# Patient Record
Sex: FEMALE | Race: WHITE | Employment: OTHER | URBAN - METROPOLITAN AREA
[De-identification: names, ages, dates, MRNs, and addresses within clinical notes are randomized per-mention and may not be internally consistent; named-entity substitution may affect disease eponyms.]

---

## 2023-03-11 PROBLEM — I60.9 SUBARACHNOID HEMORRHAGE, NONTRAUMATIC (MULTI): Status: ACTIVE | Noted: 2023-03-11

## 2023-03-11 PROBLEM — E78.5 HYPERLIPIDEMIA: Status: ACTIVE | Noted: 2023-03-11

## 2023-03-11 PROBLEM — R73.9 HYPERGLYCEMIA: Status: ACTIVE | Noted: 2023-03-11

## 2023-03-11 PROBLEM — E55.9 VITAMIN D DEFICIENCY: Status: ACTIVE | Noted: 2023-03-11

## 2023-03-11 PROBLEM — R93.1 AGATSTON CORONARY ARTERY CALCIUM SCORE GREATER THAN 400: Status: ACTIVE | Noted: 2023-03-11

## 2023-03-11 RX ORDER — METFORMIN HYDROCHLORIDE 500 MG/1
1 TABLET ORAL 2 TIMES DAILY
COMMUNITY
Start: 2021-04-15 | End: 2023-04-10 | Stop reason: SDUPTHER

## 2023-03-11 RX ORDER — PREDNISOLONE ACETATE 10 MG/ML
SUSPENSION/ DROPS OPHTHALMIC
COMMUNITY
Start: 2022-07-13 | End: 2024-04-22 | Stop reason: ALTCHOICE

## 2023-03-11 RX ORDER — CHOLECALCIFEROL (VITAMIN D3) 50 MCG
1 TABLET ORAL DAILY
COMMUNITY
Start: 2019-02-08

## 2023-03-11 RX ORDER — PHENOL 1.4 %
1 AEROSOL, SPRAY (ML) MUCOUS MEMBRANE DAILY
COMMUNITY
Start: 2018-11-02

## 2023-03-11 RX ORDER — ATORVASTATIN CALCIUM 20 MG/1
1 TABLET, FILM COATED ORAL DAILY
COMMUNITY
Start: 2022-02-23 | End: 2023-04-10 | Stop reason: SDUPTHER

## 2023-03-11 RX ORDER — LATANOPROST 50 UG/ML
SOLUTION/ DROPS OPHTHALMIC
COMMUNITY
Start: 2023-03-03

## 2023-03-11 RX ORDER — IBUPROFEN 200 MG
CAPSULE ORAL
COMMUNITY
Start: 2018-11-13

## 2023-03-23 LAB
ALANINE AMINOTRANSFERASE (SGPT) (U/L) IN SER/PLAS: 26 U/L (ref 7–45)
ALBUMIN (G/DL) IN SER/PLAS: 4 G/DL (ref 3.4–5)
ALKALINE PHOSPHATASE (U/L) IN SER/PLAS: 55 U/L (ref 33–136)
ANION GAP IN SER/PLAS: 10 MMOL/L (ref 10–20)
ASPARTATE AMINOTRANSFERASE (SGOT) (U/L) IN SER/PLAS: 20 U/L (ref 9–39)
BASOPHILS (10*3/UL) IN BLOOD BY AUTOMATED COUNT: 0.04 X10E9/L (ref 0–0.1)
BASOPHILS/100 LEUKOCYTES IN BLOOD BY AUTOMATED COUNT: 0.6 % (ref 0–2)
BILIRUBIN TOTAL (MG/DL) IN SER/PLAS: 0.8 MG/DL (ref 0–1.2)
CALCIDIOL (25 OH VITAMIN D3) (NG/ML) IN SER/PLAS: 89 NG/ML
CALCIUM (MG/DL) IN SER/PLAS: 9.1 MG/DL (ref 8.6–10.3)
CARBON DIOXIDE, TOTAL (MMOL/L) IN SER/PLAS: 30 MMOL/L (ref 21–32)
CHLORIDE (MMOL/L) IN SER/PLAS: 106 MMOL/L (ref 98–107)
CREATININE (MG/DL) IN SER/PLAS: 0.73 MG/DL (ref 0.5–1.05)
EOSINOPHILS (10*3/UL) IN BLOOD BY AUTOMATED COUNT: 0.25 X10E9/L (ref 0–0.7)
EOSINOPHILS/100 LEUKOCYTES IN BLOOD BY AUTOMATED COUNT: 3.8 % (ref 0–6)
ERYTHROCYTE DISTRIBUTION WIDTH (RATIO) BY AUTOMATED COUNT: 12 % (ref 11.5–14.5)
ERYTHROCYTE MEAN CORPUSCULAR HEMOGLOBIN CONCENTRATION (G/DL) BY AUTOMATED: 33.1 G/DL (ref 32–36)
ERYTHROCYTE MEAN CORPUSCULAR VOLUME (FL) BY AUTOMATED COUNT: 90 FL (ref 80–100)
ERYTHROCYTES (10*6/UL) IN BLOOD BY AUTOMATED COUNT: 4.84 X10E12/L (ref 4–5.2)
GFR FEMALE: >90 ML/MIN/1.73M2
GLUCOSE (MG/DL) IN SER/PLAS: 110 MG/DL (ref 74–99)
HEMATOCRIT (%) IN BLOOD BY AUTOMATED COUNT: 43.8 % (ref 36–46)
HEMOGLOBIN (G/DL) IN BLOOD: 14.5 G/DL (ref 12–16)
IMMATURE GRANULOCYTES/100 LEUKOCYTES IN BLOOD BY AUTOMATED COUNT: 0.2 % (ref 0–0.9)
LEUKOCYTES (10*3/UL) IN BLOOD BY AUTOMATED COUNT: 6.6 X10E9/L (ref 4.4–11.3)
LYMPHOCYTES (10*3/UL) IN BLOOD BY AUTOMATED COUNT: 2.1 X10E9/L (ref 1.2–4.8)
LYMPHOCYTES/100 LEUKOCYTES IN BLOOD BY AUTOMATED COUNT: 31.9 % (ref 13–44)
MONOCYTES (10*3/UL) IN BLOOD BY AUTOMATED COUNT: 0.72 X10E9/L (ref 0.1–1)
MONOCYTES/100 LEUKOCYTES IN BLOOD BY AUTOMATED COUNT: 10.9 % (ref 2–10)
NEUTROPHILS (10*3/UL) IN BLOOD BY AUTOMATED COUNT: 3.46 X10E9/L (ref 1.2–7.7)
NEUTROPHILS/100 LEUKOCYTES IN BLOOD BY AUTOMATED COUNT: 52.6 % (ref 40–80)
PLATELETS (10*3/UL) IN BLOOD AUTOMATED COUNT: 228 X10E9/L (ref 150–450)
POTASSIUM (MMOL/L) IN SER/PLAS: 4 MMOL/L (ref 3.5–5.3)
PROTEIN TOTAL: 6.2 G/DL (ref 6.4–8.2)
SODIUM (MMOL/L) IN SER/PLAS: 142 MMOL/L (ref 136–145)
THYROTROPIN (MIU/L) IN SER/PLAS BY DETECTION LIMIT <= 0.05 MIU/L: 2.21 MIU/L (ref 0.44–3.98)
UREA NITROGEN (MG/DL) IN SER/PLAS: 21 MG/DL (ref 6–23)

## 2023-04-09 NOTE — PROGRESS NOTES
"Subjective   Reason for Visit: Chelsey Ayala is an 68 y.o. female here for a Medicare Wellness visit.               HPI        April 10, 2023      This 68-year-old woman is here today for her Medicare wellness exam.    April 5, 2022  This 67-year-old woman is here today for her \"welcome to Medicare\" assessment.    March 2nd 2022 (Dr Willis)  66 year old here for follow up of SAH.   Last note Sept. 2021:  See Dr. Pardo's notes:  She was admitted on April 11 and discharged on the 14th. Her main [facility] doctor was Dr. Jhony Da Silva  She was admitted for a benign perimesencephalic subarachnoid hemorrhage.  She was transferred from the [facility] emergency room in [facility] via helicopter to the main campus because of symptoms of a headache and a CT scan showing a subarachnoid hemorrhage. An angiogram was done on April 12 which did not show an aneurysm or vascular malformation causing her hemorrhage. She received transcranial Dopplers to monitor for vasospasms. Her neurological status slowly improved and she was negative for vasospasms at the time of discharge.  DVT prophylaxis included compression stockings and heparin. Physical medicine was started on April 13 she was then discharged home with outpatient therapy.  The CT of the head and neck suggested because the hemorrhage was in the basal cisterns to volume distribution and lack of identifiable underlying aneurysm suggested the nonaneurysmal perimesencephalic subarachnoid hemorrhage. There was no apparent cervical or intracranial arterial occlusion or high-grade stenosis. An angiogram done on April 12 showed moderate atherosclerosis and an otherwise normal cerebral angiogram with no evidence of aneurysm AVM or aVF an MRI of the brain and cervical spine was done on April 13 and did not identify any intracranial mass there was a slight reduction in volume and redistribution of the previously depicted subarachnoid hemorrhage insinuating predominantly " throughout the basal cisterns there was a slight increased hemorrhaging layer in the occipital horns of the lateral ventricles there was a single caliber of the ventricular system.  Has had a follow up Ct Scan of the brain in May, 2021. No new blood and subarachnoid blood was re-absorbed.   She had the SAH 11 days post second Pfizer COVID 19 vaccine and she wonders if it could be related. I reassured her that it would be highly unlikely.   PMH: DM, HLD  PFH: Father had atherosclerosis. IMP: 1. S/P nonaneurysmal perimesencephalic subarachnoid hemorrhage. Doing well. Neurologically normal.   REC:  1. Check Ct Scan of brain without contrast.   You are doing great!      January 20, 2022  This 66-year-old woman is here today to follow-up on her diabetes and hyperlipidemia.  Total 231 HDL 50 ratio 4.6  triglycerides 120  Hemoglobin A1c 7.3%  Patient is taking metformin 500 mg once a day, will increase to twice a day and if hemoglobin A1c is not below 7 in 3 months we will consider adding additional medication. Previously    August 16, 2021  This 66-year-old woman is here today for several reasons. She is keeping her blood sugar under control with a hemoglobin A1c of 6.6%. She is only taking her Metformin once a day because when she took it twice a day she was getting some abnormally low readings.  She would also like to get a referral to neurology because of her subarachnoid hemorrhage. I will plan on seeing her back in 6 months.  Hemoglobin A1c 6.6%  Lipids completed  Microalbumin 30 creatinine 100 AC ratio less than 30 normal    May 13, 2021  This 66-year-old woman is recovering from her subarachnoid hemorrhage. She has been taking Metformin except around the time when she is getting contrast material with imaging. He has an appointment tomorrow on May 14 to recheck the CT scan to see if the blood from her bleed has been reabsorbed. She also has an appointment tomorrow with practitioners for some cognitive  testing following her hemorrhage.  She also is getting a nasal swab today for  Resistant staph aureus. She has been monitoring her blood sugars and generally they have been in the range of 130 240. This is especially true over the last 1 to 2 weeks that she has been able to get back to the gym for exercise. Her hemoglobin A1c on May 18, 2020 was 7.7  On August 21, 2020 was 7.3  On March 1, 2021 it was 6.5  Today on May 13 it is back up to 7.7  We are keeping the medication the same and she will continue to work with diet and exercise.    April 15, 2021  This 66-year-old woman was discharged from the [facility] on April 14 after having been admitted on April 11 for a nonaneurysmal subarachnoid hemorrhage. When the hospital determined risk factors that could have played a role in the development of her hemorrhage the only risk factor she found that she felt was applicable to her was diabetes. As a result she would like to get her diabetes under better control. She has been checking fasting blood sugars and they have generally been averaging in the high 130s. Today her hemoglobin A1c is 7.3% this would suggest this at higher average fasting blood sugar closer to 150.  I am starting her on a prescription for Metformin 500 mg twice daily and I will plan on seeing her back in 1 month with a list of her blood sugar readings.  She is scheduled to get another CT scan on May 14.  She was admitted on April 11 and discharged on the 14th. Her main [facility] doctor was Dr. Jhony Da Silva  She was admitted for a benign perimesencephalic subarachnoid hemorrhage.  She was transferred from the [facility] emergency room in [facility] via helicopter to the main campus because of symptoms of a headache and a CT scan showing a subarachnoid hemorrhage. An angiogram was done on April 12 which did not show an aneurysm or vascular malformation causing her hemorrhage. He received transcranial Dopplers to monitor for vasospasms. Her  neurological status slowly improved and she was negative for vasospasms at the time of discharge.  DVT prophylaxis included compression stockings and heparin. Physical medicine was started on April 13 she was then discharged home with outpatient therapy.  The CT of the head and neck suggested because the hemorrhage was in the basal cisterns to volume distribution and lack of identifiable underlying aneurysm suggested the nonaneurysmal perimesencephalic subarachnoid hemorrhage. There was no apparent cervical or intracranial arterial occlusion or high-grade stenosis. An angiogram done on April 12 showed moderate atherosclerosis and an otherwise normal cerebral angiogram with no evidence of aneurysm AVM or aVF an MRI of the brain and cervical spine was done on April 13 and did not identify any intracranial mass there was a slight reduction in volume and redistribution of the previously depicted subarachnoid hemorrhage insinuating predominantly throughout the basal cisterns there was a slight increased hemorrhaging layer in the occipital horns of the lateral ventricles there was a single caliber of the ventricular system    Patient Care Team:  Jayjay Pardo MD as PCP - General  Jayjay Pardo MD as PCP - [facility]     Review of Systems   Constitutional:  Negative for appetite change, chills, diaphoresis, fatigue, fever and unexpected weight change.   HENT:  Negative for congestion, ear discharge, ear pain, hearing loss, nosebleeds, sinus pressure, sore throat, tinnitus, trouble swallowing and voice change.    Eyes:  Negative for photophobia, pain, discharge, redness, itching and visual disturbance.   Respiratory:  Negative for cough, chest tightness, shortness of breath and wheezing.    Cardiovascular:  Negative for chest pain, palpitations and leg swelling.   Gastrointestinal:  Negative for abdominal distention, abdominal pain, anal bleeding, blood in stool, constipation, diarrhea, nausea and  vomiting.   Endocrine: Negative for polyuria.   Genitourinary:  Negative for frequency and hematuria.   Musculoskeletal:  Negative for arthralgias, back pain, neck pain and neck stiffness.   Skin:  Negative for rash.   Allergic/Immunologic: Negative for environmental allergies and food allergies.   Neurological:  Negative for dizziness, tremors, seizures, syncope, speech difficulty, weakness, numbness and headaches.   Hematological:  Negative for adenopathy. Does not bruise/bleed easily.   Psychiatric/Behavioral:  Negative for agitation, behavioral problems, confusion, decreased concentration, sleep disturbance and suicidal ideas. The patient is not nervous/anxious.        Objective   Vitals:  There were no vitals taken for this visit.      Physical Exam  Constitutional:       General: She is not in acute distress.     Appearance: Normal appearance. She is normal weight. She is not ill-appearing.   HENT:      Head: Normocephalic.      Right Ear: Tympanic membrane and ear canal normal. There is no impacted cerumen.      Left Ear: Tympanic membrane and ear canal normal.      Nose: Nose normal. No congestion or rhinorrhea.      Mouth/Throat:      Mouth: Mucous membranes are moist.      Pharynx: Oropharynx is clear.   Eyes:      Extraocular Movements: Extraocular movements intact.      Conjunctiva/sclera: Conjunctivae normal.      Pupils: Pupils are equal, round, and reactive to light.   Neck:      Vascular: No carotid bruit.   Cardiovascular:      Rate and Rhythm: Normal rate and regular rhythm.      Pulses: Normal pulses.      Heart sounds: Normal heart sounds. No murmur heard.  Pulmonary:      Effort: Pulmonary effort is normal. No respiratory distress.      Breath sounds: No wheezing or rales.   Abdominal:      General: Abdomen is flat. Bowel sounds are normal. There is no distension.      Palpations: There is no mass.      Tenderness: There is no abdominal tenderness. There is no guarding or rebound.      Hernia:  No hernia is present.   Genitourinary:     Rectum: Normal.   Musculoskeletal:         General: No swelling, tenderness or deformity. Normal range of motion.      Cervical back: Normal range of motion and neck supple. No tenderness.      Right lower leg: No edema.      Left lower leg: No edema.   Lymphadenopathy:      Cervical: No cervical adenopathy.   Skin:     General: Skin is warm and dry.      Findings: No erythema or rash.   Neurological:      Mental Status: She is alert.         Assessment/Plan   Problem List Items Addressed This Visit    None

## 2023-04-10 ENCOUNTER — OFFICE VISIT (OUTPATIENT)
Dept: PRIMARY CARE | Facility: CLINIC | Age: 68
End: 2023-04-10
Payer: MEDICARE

## 2023-04-10 VITALS
SYSTOLIC BLOOD PRESSURE: 120 MMHG | TEMPERATURE: 97.9 F | DIASTOLIC BLOOD PRESSURE: 76 MMHG | WEIGHT: 179 LBS | BODY MASS INDEX: 30.56 KG/M2 | HEIGHT: 64 IN

## 2023-04-10 DIAGNOSIS — R73.9 HYPERGLYCEMIA: ICD-10-CM

## 2023-04-10 DIAGNOSIS — E55.9 VITAMIN D DEFICIENCY: ICD-10-CM

## 2023-04-10 DIAGNOSIS — I10 PRIMARY HYPERTENSION: ICD-10-CM

## 2023-04-10 DIAGNOSIS — R93.1 AGATSTON CORONARY ARTERY CALCIUM SCORE GREATER THAN 400: ICD-10-CM

## 2023-04-10 DIAGNOSIS — Z00.00 MEDICARE ANNUAL WELLNESS VISIT, SUBSEQUENT: Primary | ICD-10-CM

## 2023-04-10 DIAGNOSIS — E78.5 HYPERLIPIDEMIA, UNSPECIFIED HYPERLIPIDEMIA TYPE: ICD-10-CM

## 2023-04-10 DIAGNOSIS — Z86.79 HISTORY OF SUBARACHNOID HEMORRHAGE: ICD-10-CM

## 2023-04-10 LAB
POC HDL CHOLESTEROL: 51 MG/DL (ref 0–50)
POC HEMOGLOBIN A1C: 6.4 % (ref 4.2–6.5)
POC LDL CHOLESTEROL: 70 MG/DL (ref 0–100)
POC NON-HDL CHOLESTEROL: 86 MG/DL (ref 0–130)
POC TOTAL CHOLESTEROL/HDL RATIO: 2.7 (ref 0–4.5)
POC TOTAL CHOLESTEROL: 136 MG/DL (ref 0–199)
POC TRIGLYCERIDES: 77 MG/DL (ref 0–150)

## 2023-04-10 PROCEDURE — 1159F MED LIST DOCD IN RCRD: CPT | Performed by: FAMILY MEDICINE

## 2023-04-10 PROCEDURE — 3078F DIAST BP <80 MM HG: CPT | Performed by: FAMILY MEDICINE

## 2023-04-10 PROCEDURE — 3074F SYST BP LT 130 MM HG: CPT | Performed by: FAMILY MEDICINE

## 2023-04-10 PROCEDURE — 36416 COLLJ CAPILLARY BLOOD SPEC: CPT | Performed by: FAMILY MEDICINE

## 2023-04-10 PROCEDURE — 1160F RVW MEDS BY RX/DR IN RCRD: CPT | Performed by: FAMILY MEDICINE

## 2023-04-10 PROCEDURE — 1170F FXNL STATUS ASSESSED: CPT | Performed by: FAMILY MEDICINE

## 2023-04-10 PROCEDURE — 99214 OFFICE O/P EST MOD 30 MIN: CPT | Performed by: FAMILY MEDICINE

## 2023-04-10 PROCEDURE — 1036F TOBACCO NON-USER: CPT | Performed by: FAMILY MEDICINE

## 2023-04-10 PROCEDURE — G0439 PPPS, SUBSEQ VISIT: HCPCS | Performed by: FAMILY MEDICINE

## 2023-04-10 PROCEDURE — 83036 HEMOGLOBIN GLYCOSYLATED A1C: CPT | Performed by: FAMILY MEDICINE

## 2023-04-10 PROCEDURE — 80061 LIPID PANEL: CPT | Performed by: FAMILY MEDICINE

## 2023-04-10 RX ORDER — METFORMIN HYDROCHLORIDE 500 MG/1
500 TABLET ORAL 2 TIMES DAILY
Qty: 180 TABLET | Refills: 3 | Status: SHIPPED | OUTPATIENT
Start: 2023-04-10 | End: 2024-03-25

## 2023-04-10 RX ORDER — SPIRONOLACTONE 25 MG
1 TABLET ORAL DAILY
COMMUNITY

## 2023-04-10 RX ORDER — GUAIFENESIN AND PHENYLEPHRINE HCL 400; 10 MG/1; MG/1
1 TABLET ORAL EVERY 24 HOURS
COMMUNITY
Start: 2021-03-01 | End: 2024-04-22 | Stop reason: SDUPTHER

## 2023-04-10 RX ORDER — ATORVASTATIN CALCIUM 20 MG/1
20 TABLET, FILM COATED ORAL DAILY
Qty: 90 TABLET | Refills: 3 | Status: SHIPPED | OUTPATIENT
Start: 2023-04-10 | End: 2024-03-25

## 2023-04-10 ASSESSMENT — ENCOUNTER SYMPTOMS
VOICE CHANGE: 0
EYE DISCHARGE: 0
SEIZURES: 0
HEMATURIA: 0
NERVOUS/ANXIOUS: 0
SPEECH DIFFICULTY: 0
DECREASED CONCENTRATION: 0
SINUS PRESSURE: 0
BLOOD IN STOOL: 0
SHORTNESS OF BREATH: 0
CONFUSION: 0
COUGH: 0
DIAPHORESIS: 0
FEVER: 0
CHEST TIGHTNESS: 0
DIARRHEA: 0
BRUISES/BLEEDS EASILY: 0
NECK PAIN: 0
TROUBLE SWALLOWING: 0
APPETITE CHANGE: 0
ADENOPATHY: 0
DIZZINESS: 0
CHILLS: 0
WHEEZING: 0
AGITATION: 0
EYE PAIN: 0
ABDOMINAL PAIN: 0
UNEXPECTED WEIGHT CHANGE: 0
VOMITING: 0
WEAKNESS: 0
BACK PAIN: 0
EYE ITCHING: 0
NUMBNESS: 0
SLEEP DISTURBANCE: 0
TREMORS: 0
FATIGUE: 0
HEADACHES: 0
SORE THROAT: 0
ABDOMINAL DISTENTION: 0
NAUSEA: 0
NECK STIFFNESS: 0
PALPITATIONS: 0
CONSTIPATION: 0
PHOTOPHOBIA: 0
ANAL BLEEDING: 0
ARTHRALGIAS: 0
FREQUENCY: 0
EYE REDNESS: 0

## 2023-04-10 ASSESSMENT — ACTIVITIES OF DAILY LIVING (ADL)
MANAGING_FINANCES: INDEPENDENT
TAKING_MEDICATION: INDEPENDENT
DOING_HOUSEWORK: INDEPENDENT
GROCERY_SHOPPING: INDEPENDENT
BATHING: INDEPENDENT
DRESSING: INDEPENDENT

## 2023-04-10 ASSESSMENT — PATIENT HEALTH QUESTIONNAIRE - PHQ9
SUM OF ALL RESPONSES TO PHQ QUESTIONS 1-9: 0
1. LITTLE INTEREST OR PLEASURE IN DOING THINGS: NOT AT ALL
SUM OF ALL RESPONSES TO PHQ9 QUESTIONS 1 AND 2: 0
8. MOVING OR SPEAKING SO SLOWLY THAT OTHER PEOPLE COULD HAVE NOTICED. OR THE OPPOSITE, BEING SO FIGETY OR RESTLESS THAT YOU HAVE BEEN MOVING AROUND A LOT MORE THAN USUAL: NOT AT ALL
SUM OF ALL RESPONSES TO PHQ9 QUESTIONS 1 AND 2: 0
4. FEELING TIRED OR HAVING LITTLE ENERGY: NOT AT ALL
3. TROUBLE FALLING OR STAYING ASLEEP OR SLEEPING TOO MUCH: NOT AT ALL
2. FEELING DOWN, DEPRESSED OR HOPELESS: NOT AT ALL
9. THOUGHTS THAT YOU WOULD BE BETTER OFF DEAD, OR OF HURTING YOURSELF: NOT AT ALL
1. LITTLE INTEREST OR PLEASURE IN DOING THINGS: NOT AT ALL
6. FEELING BAD ABOUT YOURSELF - OR THAT YOU ARE A FAILURE OR HAVE LET YOURSELF OR YOUR FAMILY DOWN: NOT AT ALL
7. TROUBLE CONCENTRATING ON THINGS, SUCH AS READING THE NEWSPAPER OR WATCHING TELEVISION: NOT AT ALL
SUM OF ALL RESPONSES TO PHQ9 QUESTIONS 1 AND 2: 0
2. FEELING DOWN, DEPRESSED OR HOPELESS: NOT AT ALL
1. LITTLE INTEREST OR PLEASURE IN DOING THINGS: NOT AT ALL
10. IF YOU CHECKED OFF ANY PROBLEMS, HOW DIFFICULT HAVE THESE PROBLEMS MADE IT FOR YOU TO DO YOUR WORK, TAKE CARE OF THINGS AT HOME, OR GET ALONG WITH OTHER PEOPLE: NOT DIFFICULT AT ALL
5. POOR APPETITE OR OVEREATING: NOT AT ALL
2. FEELING DOWN, DEPRESSED OR HOPELESS: NOT AT ALL

## 2023-10-16 ENCOUNTER — OFFICE VISIT (OUTPATIENT)
Dept: PRIMARY CARE | Facility: CLINIC | Age: 68
End: 2023-10-16
Payer: MEDICARE

## 2023-10-16 VITALS
HEART RATE: 87 BPM | OXYGEN SATURATION: 94 % | DIASTOLIC BLOOD PRESSURE: 60 MMHG | TEMPERATURE: 96.7 F | BODY MASS INDEX: 31.97 KG/M2 | SYSTOLIC BLOOD PRESSURE: 104 MMHG | WEIGHT: 185 LBS

## 2023-10-16 DIAGNOSIS — R73.9 HYPERGLYCEMIA: ICD-10-CM

## 2023-10-16 DIAGNOSIS — M25.562 CHRONIC PAIN OF LEFT KNEE: Primary | ICD-10-CM

## 2023-10-16 DIAGNOSIS — E55.9 VITAMIN D DEFICIENCY: ICD-10-CM

## 2023-10-16 DIAGNOSIS — G89.29 CHRONIC PAIN OF LEFT KNEE: Primary | ICD-10-CM

## 2023-10-16 DIAGNOSIS — E78.2 MIXED HYPERLIPIDEMIA: ICD-10-CM

## 2023-10-16 PROCEDURE — 1036F TOBACCO NON-USER: CPT | Performed by: STUDENT IN AN ORGANIZED HEALTH CARE EDUCATION/TRAINING PROGRAM

## 2023-10-16 PROCEDURE — 1160F RVW MEDS BY RX/DR IN RCRD: CPT | Performed by: STUDENT IN AN ORGANIZED HEALTH CARE EDUCATION/TRAINING PROGRAM

## 2023-10-16 PROCEDURE — 1159F MED LIST DOCD IN RCRD: CPT | Performed by: STUDENT IN AN ORGANIZED HEALTH CARE EDUCATION/TRAINING PROGRAM

## 2023-10-16 PROCEDURE — 99214 OFFICE O/P EST MOD 30 MIN: CPT | Performed by: STUDENT IN AN ORGANIZED HEALTH CARE EDUCATION/TRAINING PROGRAM

## 2023-10-16 RX ORDER — DICLOFENAC SODIUM 10 MG/G
4 GEL TOPICAL 4 TIMES DAILY
Qty: 100 G | Refills: 1 | Status: SHIPPED | OUTPATIENT
Start: 2023-10-16

## 2023-10-16 ASSESSMENT — PATIENT HEALTH QUESTIONNAIRE - PHQ9
1. LITTLE INTEREST OR PLEASURE IN DOING THINGS: NOT AT ALL
SUM OF ALL RESPONSES TO PHQ9 QUESTIONS 1 AND 2: 0
2. FEELING DOWN, DEPRESSED OR HOPELESS: NOT AT ALL

## 2023-10-16 NOTE — PATIENT INSTRUCTIONS
Referral to physical therapy  Prescription for Voltaren gel sent to pharmacy.  If your insurance will not cover it, it is over-the-counter  We will see you back in April for a Medicare physical with fasting blood work prior

## 2023-10-16 NOTE — PROGRESS NOTES
Subjective   Patient ID: Chelsey Ayala is a 68 y.o. female who presents for Transfer Of Care (From Dr Pardo. 6 mon fu and been having issues with left knee) and Flu Vaccine (Pt already got it).    HPI   She has arthralgia in her L knee. She played beach volleyball in [facility] for 10 years in the 80s. Knee pain is worse in the winter. Has been having an issue x 10 years. Pain is medial. Walking does hurt and worse when its cold. Sometimes it feels like it is going to give out. No locking or catching. No swelling. She does exercise daily, recumbent bike at home. No issue with that. Takes turmeric. That does help.       Review of Systems   Constitutional:  Negative for chills, fatigue and fever.   Respiratory:  Negative for cough, shortness of breath and wheezing.    Cardiovascular:  Negative for chest pain, palpitations and leg swelling.   Gastrointestinal:  Negative for abdominal pain, constipation, diarrhea and nausea.   Genitourinary:  Negative for dysuria, frequency, hematuria and urgency.   Neurological:  Negative for dizziness, numbness and headaches.   Psychiatric/Behavioral:  Negative for dysphoric mood. The patient is not nervous/anxious.        Objective   /60 (BP Location: Left arm, Patient Position: Sitting)   Pulse 87   Temp 35.9 °C (96.7 °F) (Temporal)   Wt 83.9 kg (185 lb)   SpO2 94%   BMI 31.97 kg/m²     Physical Exam  Constitutional:       Appearance: Normal appearance.   HENT:      Head: Normocephalic and atraumatic.   Eyes:      Extraocular Movements: Extraocular movements intact.      Pupils: Pupils are equal, round, and reactive to light.   Cardiovascular:      Rate and Rhythm: Normal rate and regular rhythm.      Heart sounds: Normal heart sounds. No murmur heard.  Pulmonary:      Effort: Pulmonary effort is normal.      Breath sounds: Normal breath sounds. No wheezing.   Abdominal:      General: Bowel sounds are normal.      Palpations: Abdomen is soft.      Tenderness:  There is no abdominal tenderness. There is no guarding.   Musculoskeletal:         General: Normal range of motion.      Left knee:      Instability Tests: Medial Cecilia test positive.   Skin:     General: Skin is warm and dry.   Neurological:      General: No focal deficit present.      Mental Status: She is alert and oriented to person, place, and time.   Psychiatric:         Mood and Affect: Mood normal.         Behavior: Behavior normal.     Right Knee Exam   Right knee exam is normal.      Left Knee Exam     Tenderness   The patient is experiencing tenderness in the medial joint line.    Range of Motion   The patient has normal left knee ROM.    Tests   Cecilia:  Medial - positive   Varus: negative Valgus: negative  Lachman:  Anterior - negative      Drawer:  Anterior - negative     Posterior - negative    Other   Sensation: normal    Comments:  Positive Thessaly's              Assessment/Plan   Problem List Items Addressed This Visit       Hyperglycemia    Relevant Orders    Hemoglobin A1C    Hyperlipidemia    Relevant Orders    Lipid Panel    Comprehensive Metabolic Panel    CBC    Vitamin D deficiency    Relevant Orders    Vitamin D 25-Hydroxy,Total (for eval of Vitamin D levels)     Other Visit Diagnoses       Chronic pain of left knee    -  Primary    Relevant Medications    diclofenac sodium (Voltaren) 1 % gel gel    Other Relevant Orders    Referral to Physical Therapy          Osteoarthritis versus degenerative meniscus tear.  Referral for physical therapy.  Improving will need to pursue imaging and possible corticosteroid injection.  Voltaren gel sent to pharmacy  We will see her back in April for Medicare physical with fasting blood work prior       Patient understands and agrees with treatment plan    Donya Lee,    10/22/23

## 2023-10-22 ASSESSMENT — ENCOUNTER SYMPTOMS
WHEEZING: 0
DYSPHORIC MOOD: 0
DIZZINESS: 0
CHILLS: 0
PALPITATIONS: 0
ABDOMINAL PAIN: 0
NUMBNESS: 0
DYSURIA: 0
COUGH: 0
FREQUENCY: 0
FATIGUE: 0
HEMATURIA: 0
NERVOUS/ANXIOUS: 0
HEADACHES: 0
FEVER: 0
CONSTIPATION: 0
DIARRHEA: 0
SHORTNESS OF BREATH: 0
NAUSEA: 0

## 2023-10-29 NOTE — PROGRESS NOTES
"Physical Therapy Evaluation and Treatment      Patient Name: Chelsey Ayala  MRN: [id number]  Today's Date: 10/30/2023     Time Calculation  Start Time: 1030  Stop Time: 1120  Time Calculation (min): 50 min  PT Therapeutic Procedures Time Entry  Therapeutic Exercise Time Entry: 15  Self-Care/Home Mgmt Trainin  Visit: 1/MN  Referred by: Donya Lee DO   Insurance: Medicare [employer], [place]  Certification Dates:  10- to 2023    Current Problem: M25.562, G89.29   1. Chronic pain of left knee  Referral to Physical Therapy      Subjective:  Patient's Living Environment: 14 steps between floors/basement, 2 to enter, she lives with her , son, daughter in law, their 14 month old and her 2 parents, who are over visiting from [place].  She has a lot of support if needed.      Previous Level of Function:  Pt is functionally limited due to pain with prolonged WB.  She has membership at [employer] that she has not used in awhile.      Patient's Therapy Goals:  To decrease pain, walk for health benefit.     Pain;  Intensity:  0/10 rest, at worst & with increased WB 4/10            Location: Medial aspect of Left Knee, MJL, and just anterior to this            Duration:  Intermittent            Aggravating Factor:  stairs, prolonged WB            Alleviating Factor:  Topical Angelsic with Arnica, Tumeric, decreased WB     Objective   ROM   Knee AROM: (degrees) Left Right   Flexion 125 130   Extension 0 0   Flexibility     Hamstrings -32 -35   Gastroc 10 8   Hip Flexors 132 134     Knee Strength: MMT Left Right   Flexion +4/5 5/5   Extension +4/5 5/5   Hip Flexion 5/5 5/5   Hip Abduction 5/5 5/5   Hip Adduction 5/5 5/5   Ankle DF 5/5 5/5   Ankle PF 5/5 5/5   *denotes pain with motion or muscle testing    Girth:    MJL:   Rt    37     Lt 37  1\"above MJL:  Rt    40     Lt  38  3\" above MJL: Rt    43.5  Lt  39  Max Calf:  Rt    36     Lt  44  *Measurements were taken in centimeters. 2.54 cm " = 1 inch     Special Tests:  Varus, Valgus, Fareed's all (-)  Gait:  Pt ambulates independently no gait deficits observed  Palpation:  + Rt MJL pain  Patellar mobility:  Good all direction, bilaterally. No pain with testing.   Stairs:  Pt able to perform reciprocal stairclimbing but has pain    Imaging Left Knee (3-1-2021):  Moderate joint space narrowing medial compartment.  Mild   degenerative changes of the patellofemoral compartment. No sizable effusion.  Moderate left knee  osteoarthrosis prominent involving the medial compartment.  Balance:  SLS 5 sec each    Outcome Measures:  LEFS: 56%     Treatments:  Access Code: [email or web address]  URL: [email or web address]  Date: 10/30/2023  Prepared by: Kelsea Colin  Exercises:    - Supine Active Straight Leg Raise  - 1 x daily - 7 x weekly - 1-2 sets - 10 reps  - Supine Short Arc Quad  - 1 x daily - 7 x weekly - 1-2 sets - 10 reps - 3-5 hold  - Seated Long Arc Quad  - 1 x daily - 7 x weekly - 1-2 sets - 10 reps - 3-5 hold  - Mini Squat with Counter Support  - 1 x daily - 7 x weekly - 1-2 sets - 10 reps  - Side Stepping with Resistance at Ankles  - 1 x daily - 7 x weekly - 1-2 sets - 10 reps    Assessment:   Pt is a 69 yo female who presented to the clinic today c/o chronic left knee pain, 3+ years.  PMH significant for T2DM, subarachnoid hemorrhage.  She reports no residual affects from the bleed, with the exception some slurring, very mild.  Signs and symptoms consistent with osteoarthritis.  She denies any clicking or catching.  Symptoms provoked by prolonged WB, alleviated by rest.  She denies night pain. We discussed strengthening for greater stability around the knee.  Good tolerance to today's activities.  She will benefit from skilled PT to address the above mentioned impairments.  Pt prefers one visit and HEP.    Moderate complexity due to patient's clinical presentation being evolving with changing characteristics, with comorbidities to  include T2DM, all of which may negatively impact rehab tolerance and progression.      Plan: 1 x for HEP set up    Goals:   Active       PT Problem       PT Goal 1       Start:  10/30/23       Patient will increase Left Knee strength to 5/5 to improve functional mobility for ADL's, groceries, laundry, meal prep.          PT Goal 2       Start:  10/30/23       Pt will increase LEFS score to > or = 65% to demonstrate functional improvement.           PT Goal 3       Start:  10/30/23       Pt will improve bilateral hamstring flexibility to -25 to improve knee arthrokinematics, decrease injury risk.          PT Goal 4       Start:  10/30/23       Pt independent and consistent with HEP for optimal gains.

## 2023-10-30 ENCOUNTER — EVALUATION (OUTPATIENT)
Dept: PHYSICAL THERAPY | Facility: CLINIC | Age: 68
End: 2023-10-30
Payer: MEDICARE

## 2023-10-30 DIAGNOSIS — M25.562 CHRONIC PAIN OF LEFT KNEE: Primary | Chronic | ICD-10-CM

## 2023-10-30 DIAGNOSIS — G89.29 CHRONIC PAIN OF LEFT KNEE: Primary | Chronic | ICD-10-CM

## 2023-10-30 PROCEDURE — 97535 SELF CARE MNGMENT TRAINING: CPT | Mod: GP

## 2023-10-30 PROCEDURE — 97110 THERAPEUTIC EXERCISES: CPT | Mod: GP

## 2023-10-30 PROCEDURE — 97162 PT EVAL MOD COMPLEX 30 MIN: CPT | Mod: GP

## 2023-10-30 ASSESSMENT — ENCOUNTER SYMPTOMS
DEPRESSION: 0
OCCASIONAL FEELINGS OF UNSTEADINESS: 1
LOSS OF SENSATION IN FEET: 0

## 2023-10-30 NOTE — Clinical Note
October 30, 2023     Patient: Chelsey Ayala   YOB: 1955   Date of Visit: 10/30/2023       To Whom It May Concern:    It is my medical opinion that Chelsey Ayala {Work release (duty restriction):56743}.    If you have any questions or concerns, please don't hesitate to call.         Sincerely,        Kelsea Colin , PT    CC: No Recipients

## 2023-10-30 NOTE — Clinical Note
October 30, 2023     Patient: Chelsey Ayala   YOB: 1955   Date of Visit: 10/30/2023       To Whom it May Concern:    Chelsey Ayala was seen in my clinic on 10/30/2023. She {Return to school/sport:63671}.    If you have any questions or concerns, please don't hesitate to call.         Sincerely,          Kelsea Colin , PT        CC: No Recipients

## 2024-03-23 DIAGNOSIS — E78.5 HYPERLIPIDEMIA, UNSPECIFIED HYPERLIPIDEMIA TYPE: ICD-10-CM

## 2024-03-23 DIAGNOSIS — R73.9 HYPERGLYCEMIA: ICD-10-CM

## 2024-03-25 RX ORDER — ATORVASTATIN CALCIUM 20 MG/1
20 TABLET, FILM COATED ORAL DAILY
Qty: 90 TABLET | Refills: 0 | Status: SHIPPED | OUTPATIENT
Start: 2024-03-25 | End: 2024-04-22 | Stop reason: SDUPTHER

## 2024-03-25 RX ORDER — METFORMIN HYDROCHLORIDE 500 MG/1
TABLET ORAL
Qty: 180 TABLET | Refills: 0 | Status: SHIPPED | OUTPATIENT
Start: 2024-03-25 | End: 2024-04-22 | Stop reason: SDUPTHER

## 2024-04-05 ENCOUNTER — LAB (OUTPATIENT)
Dept: LAB | Facility: LAB | Age: 69
End: 2024-04-05
Payer: MEDICARE

## 2024-04-05 DIAGNOSIS — R73.9 HYPERGLYCEMIA: ICD-10-CM

## 2024-04-05 DIAGNOSIS — E78.2 MIXED HYPERLIPIDEMIA: ICD-10-CM

## 2024-04-05 DIAGNOSIS — E55.9 VITAMIN D DEFICIENCY: ICD-10-CM

## 2024-04-05 LAB
25(OH)D3 SERPL-MCNC: 83 NG/ML (ref 30–100)
ALBUMIN SERPL BCP-MCNC: 4.3 G/DL (ref 3.4–5)
ALP SERPL-CCNC: 57 U/L (ref 33–136)
ALT SERPL W P-5'-P-CCNC: 25 U/L (ref 7–45)
ANION GAP SERPL CALC-SCNC: 10 MMOL/L (ref 10–20)
AST SERPL W P-5'-P-CCNC: 17 U/L (ref 9–39)
BILIRUB SERPL-MCNC: 0.9 MG/DL (ref 0–1.2)
BUN SERPL-MCNC: 27 MG/DL (ref 6–23)
CALCIUM SERPL-MCNC: 9.4 MG/DL (ref 8.6–10.3)
CHLORIDE SERPL-SCNC: 107 MMOL/L (ref 98–107)
CHOLEST SERPL-MCNC: 131 MG/DL (ref 0–199)
CHOLESTEROL/HDL RATIO: 2.5
CO2 SERPL-SCNC: 28 MMOL/L (ref 21–32)
CREAT SERPL-MCNC: 0.85 MG/DL (ref 0.5–1.05)
EGFRCR SERPLBLD CKD-EPI 2021: 75 ML/MIN/1.73M*2
ERYTHROCYTE [DISTWIDTH] IN BLOOD BY AUTOMATED COUNT: 11.9 % (ref 11.5–14.5)
EST. AVERAGE GLUCOSE BLD GHB EST-MCNC: 157 MG/DL
GLUCOSE SERPL-MCNC: 133 MG/DL (ref 74–99)
HBA1C MFR BLD: 7.1 %
HCT VFR BLD AUTO: 46.1 % (ref 36–46)
HDLC SERPL-MCNC: 52.2 MG/DL
HGB BLD-MCNC: 15.6 G/DL (ref 12–16)
LDLC SERPL CALC-MCNC: 58 MG/DL
MCH RBC QN AUTO: 29.9 PG (ref 26–34)
MCHC RBC AUTO-ENTMCNC: 33.8 G/DL (ref 32–36)
MCV RBC AUTO: 89 FL (ref 80–100)
NON HDL CHOLESTEROL: 79 MG/DL (ref 0–149)
NRBC BLD-RTO: 0 /100 WBCS (ref 0–0)
PLATELET # BLD AUTO: 197 X10*3/UL (ref 150–450)
POTASSIUM SERPL-SCNC: 4.3 MMOL/L (ref 3.5–5.3)
PROT SERPL-MCNC: 6.4 G/DL (ref 6.4–8.2)
RBC # BLD AUTO: 5.21 X10*6/UL (ref 4–5.2)
SODIUM SERPL-SCNC: 141 MMOL/L (ref 136–145)
TRIGL SERPL-MCNC: 104 MG/DL (ref 0–149)
VLDL: 21 MG/DL (ref 0–40)
WBC # BLD AUTO: 5.7 X10*3/UL (ref 4.4–11.3)

## 2024-04-05 PROCEDURE — 85027 COMPLETE CBC AUTOMATED: CPT

## 2024-04-05 PROCEDURE — 83036 HEMOGLOBIN GLYCOSYLATED A1C: CPT

## 2024-04-05 PROCEDURE — 80061 LIPID PANEL: CPT

## 2024-04-05 PROCEDURE — 36415 COLL VENOUS BLD VENIPUNCTURE: CPT

## 2024-04-05 PROCEDURE — 80053 COMPREHEN METABOLIC PANEL: CPT

## 2024-04-05 PROCEDURE — 82306 VITAMIN D 25 HYDROXY: CPT

## 2024-04-12 ENCOUNTER — TELEPHONE (OUTPATIENT)
Dept: PRIMARY CARE | Facility: CLINIC | Age: 69
End: 2024-04-12
Payer: MEDICARE

## 2024-04-12 NOTE — TELEPHONE ENCOUNTER
----- Message from Donya Lee DO sent at 4/8/2024 12:23 PM EDT -----  Please let the patient know that her blood work is staying relatively stable.  Her hemoglobin A1c was 7.1.  It does appear she was a little bit dehydrated but otherwise everything else looked good

## 2024-04-22 ENCOUNTER — OFFICE VISIT (OUTPATIENT)
Dept: PRIMARY CARE | Facility: CLINIC | Age: 69
End: 2024-04-22
Payer: MEDICARE

## 2024-04-22 VITALS
TEMPERATURE: 97.3 F | WEIGHT: 188 LBS | HEART RATE: 74 BPM | SYSTOLIC BLOOD PRESSURE: 100 MMHG | HEIGHT: 64 IN | DIASTOLIC BLOOD PRESSURE: 68 MMHG | OXYGEN SATURATION: 96 % | BODY MASS INDEX: 32.1 KG/M2

## 2024-04-22 DIAGNOSIS — E78.5 HYPERLIPIDEMIA, UNSPECIFIED HYPERLIPIDEMIA TYPE: ICD-10-CM

## 2024-04-22 DIAGNOSIS — E11.9 TYPE 2 DIABETES MELLITUS WITHOUT COMPLICATION, WITHOUT LONG-TERM CURRENT USE OF INSULIN (MULTI): ICD-10-CM

## 2024-04-22 DIAGNOSIS — R73.9 HYPERGLYCEMIA: ICD-10-CM

## 2024-04-22 DIAGNOSIS — Z00.00 MEDICARE ANNUAL WELLNESS VISIT, SUBSEQUENT: Primary | ICD-10-CM

## 2024-04-22 DIAGNOSIS — Z23 ENCOUNTER FOR IMMUNIZATION: ICD-10-CM

## 2024-04-22 DIAGNOSIS — Z13.31 DEPRESSION SCREENING: ICD-10-CM

## 2024-04-22 DIAGNOSIS — Z78.9 NON-SMOKER: ICD-10-CM

## 2024-04-22 PROCEDURE — G0296 VISIT TO DETERM LDCT ELIG: HCPCS | Performed by: STUDENT IN AN ORGANIZED HEALTH CARE EDUCATION/TRAINING PROGRAM

## 2024-04-22 PROCEDURE — 3048F LDL-C <100 MG/DL: CPT | Performed by: STUDENT IN AN ORGANIZED HEALTH CARE EDUCATION/TRAINING PROGRAM

## 2024-04-22 PROCEDURE — 1158F ADVNC CARE PLAN TLK DOCD: CPT | Performed by: STUDENT IN AN ORGANIZED HEALTH CARE EDUCATION/TRAINING PROGRAM

## 2024-04-22 PROCEDURE — G0009 ADMIN PNEUMOCOCCAL VACCINE: HCPCS | Performed by: STUDENT IN AN ORGANIZED HEALTH CARE EDUCATION/TRAINING PROGRAM

## 2024-04-22 PROCEDURE — 1170F FXNL STATUS ASSESSED: CPT | Performed by: STUDENT IN AN ORGANIZED HEALTH CARE EDUCATION/TRAINING PROGRAM

## 2024-04-22 PROCEDURE — 3051F HG A1C>EQUAL 7.0%<8.0%: CPT | Performed by: STUDENT IN AN ORGANIZED HEALTH CARE EDUCATION/TRAINING PROGRAM

## 2024-04-22 PROCEDURE — 1159F MED LIST DOCD IN RCRD: CPT | Performed by: STUDENT IN AN ORGANIZED HEALTH CARE EDUCATION/TRAINING PROGRAM

## 2024-04-22 PROCEDURE — 90677 PCV20 VACCINE IM: CPT | Performed by: STUDENT IN AN ORGANIZED HEALTH CARE EDUCATION/TRAINING PROGRAM

## 2024-04-22 PROCEDURE — 3078F DIAST BP <80 MM HG: CPT | Performed by: STUDENT IN AN ORGANIZED HEALTH CARE EDUCATION/TRAINING PROGRAM

## 2024-04-22 PROCEDURE — 99213 OFFICE O/P EST LOW 20 MIN: CPT | Performed by: STUDENT IN AN ORGANIZED HEALTH CARE EDUCATION/TRAINING PROGRAM

## 2024-04-22 PROCEDURE — 3074F SYST BP LT 130 MM HG: CPT | Performed by: STUDENT IN AN ORGANIZED HEALTH CARE EDUCATION/TRAINING PROGRAM

## 2024-04-22 PROCEDURE — G0439 PPPS, SUBSEQ VISIT: HCPCS | Performed by: STUDENT IN AN ORGANIZED HEALTH CARE EDUCATION/TRAINING PROGRAM

## 2024-04-22 PROCEDURE — G0444 DEPRESSION SCREEN ANNUAL: HCPCS | Performed by: STUDENT IN AN ORGANIZED HEALTH CARE EDUCATION/TRAINING PROGRAM

## 2024-04-22 PROCEDURE — 1123F ACP DISCUSS/DSCN MKR DOCD: CPT | Performed by: STUDENT IN AN ORGANIZED HEALTH CARE EDUCATION/TRAINING PROGRAM

## 2024-04-22 PROCEDURE — 1160F RVW MEDS BY RX/DR IN RCRD: CPT | Performed by: STUDENT IN AN ORGANIZED HEALTH CARE EDUCATION/TRAINING PROGRAM

## 2024-04-22 RX ORDER — METFORMIN HYDROCHLORIDE 500 MG/1
500 TABLET ORAL
Qty: 180 TABLET | Refills: 1 | Status: SHIPPED | OUTPATIENT
Start: 2024-04-22

## 2024-04-22 RX ORDER — ATORVASTATIN CALCIUM 20 MG/1
20 TABLET, FILM COATED ORAL DAILY
Qty: 90 TABLET | Refills: 3 | Status: SHIPPED | OUTPATIENT
Start: 2024-04-22

## 2024-04-22 ASSESSMENT — ENCOUNTER SYMPTOMS
CHILLS: 0
NERVOUS/ANXIOUS: 0
CONSTIPATION: 0
HEADACHES: 0
DYSURIA: 0
PALPITATIONS: 0
DIARRHEA: 0
ABDOMINAL PAIN: 0
DYSPHORIC MOOD: 0
DEPRESSION: 0
NAUSEA: 0
HEMATURIA: 0
WHEEZING: 0
LOSS OF SENSATION IN FEET: 0
FATIGUE: 0
OCCASIONAL FEELINGS OF UNSTEADINESS: 0
SHORTNESS OF BREATH: 0
FEVER: 0
DIZZINESS: 0
COUGH: 0
FREQUENCY: 0
NUMBNESS: 0

## 2024-04-22 ASSESSMENT — PATIENT HEALTH QUESTIONNAIRE - PHQ9
2. FEELING DOWN, DEPRESSED OR HOPELESS: NOT AT ALL
1. LITTLE INTEREST OR PLEASURE IN DOING THINGS: NOT AT ALL
SUM OF ALL RESPONSES TO PHQ9 QUESTIONS 1 AND 2: 0

## 2024-04-22 ASSESSMENT — ACTIVITIES OF DAILY LIVING (ADL)
MANAGING_FINANCES: INDEPENDENT
GROCERY_SHOPPING: INDEPENDENT
TAKING_MEDICATION: INDEPENDENT
BATHING: INDEPENDENT
DRESSING: INDEPENDENT
DOING_HOUSEWORK: INDEPENDENT

## 2024-04-22 NOTE — PROGRESS NOTES
Subjective   Reason for Visit: Chelsey Ayala is an 69 y.o. female here for a Medicare Wellness visit.     Past Medical, Surgical, and Family History reviewed and updated in chart.    Reviewed all medications by prescribing practitioner or clinical pharmacist (such as prescriptions, OTCs, herbal therapies and supplements) and documented in the medical record.    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  Hx of DXA: declines  History of depression or anxiety: yes, situational  Immunizations: not up to date - will get Prevnar 20   Diet: Follows a healthy diet  Exercise: Gets regular exercise, recumbent bike, 5 miles daily   Alcohol abuse screen:   none   How many times in the past year 4 or more drinks in a day?  Lung cancer screening:   Smoking history: ex-smoker and quit in 1990  Drug use: No  Depression screen yearly (phq-2): 0  Living will/healthcare power of :No, does think she wants DNR CCA    Patient Self Assessment of Health Status  Patient Self Assessment: Good  Functional Ability/Level of Safety  Cognitive Impairment Observed: No cognitive impairment observed  Nutrition and Exercise  Current Diet: Well Balanced Diet  Adequate Fluid Intake: Yes  Caffeine: Yes  Exercise Frequency: Regularly  ADL Screening  Hearing - Right Ear: Difficulty with noise  Hearing - Left Ear: Difficulty with noise  Bathing: Independent  Dressing: Independent  Walks in Home: Independent  IADL's  Managing Finances: Independent  Grocery Shopping: Independent  Taking Medication: Independent  Doing Housework: Independent      Follow up of diabetes.  Current treatments: Metformin 500 mg twice daily. Current symptoms include: none. Patient denies paresthesia of the feet and visual disturbances. Home sugars: 120s . Last  "diabetic eye exam yearly.  Statin: Yes atorvastatin 20 mg ACE/ARB: No low bp.  A1c 7.1, recent before was 6.4        Patient Care Team:  Donya Lee DO as PCP - General (Family Medicine)  Jayjay Pardo MD as PCP - [facility] Attributed Provider     Review of Systems   Constitutional:  Negative for chills, fatigue and fever.   Respiratory:  Negative for cough, shortness of breath and wheezing.    Cardiovascular:  Negative for chest pain, palpitations and leg swelling.   Gastrointestinal:  Negative for abdominal pain, constipation, diarrhea and nausea.   Genitourinary:  Negative for dysuria, frequency, hematuria and urgency.   Neurological:  Negative for dizziness, numbness and headaches.   Psychiatric/Behavioral:  Negative for dysphoric mood. The patient is not nervous/anxious.        Objective   Vitals:  BP 96/64 (BP Location: Left arm, Patient Position: Sitting, BP Cuff Size: Large adult)   Pulse 74   Temp 36.3 °C (97.3 °F) (Temporal)   Ht 1.626 m (5' 4\")   Wt 85.3 kg (188 lb)   SpO2 96%   BMI 32.27 kg/m²       Physical Exam  Constitutional:       General: She is not in acute distress.     Appearance: Normal appearance.   HENT:      Head: Normocephalic and atraumatic.      Right Ear: Tympanic membrane and ear canal normal.      Left Ear: Tympanic membrane and ear canal normal.      Mouth/Throat:      Mouth: Mucous membranes are moist.      Pharynx: No posterior oropharyngeal erythema.   Eyes:      Extraocular Movements: Extraocular movements intact.      Pupils: Pupils are equal, round, and reactive to light.   Cardiovascular:      Rate and Rhythm: Normal rate and regular rhythm.      Heart sounds: No murmur heard.  Pulmonary:      Effort: Pulmonary effort is normal. No respiratory distress.      Breath sounds: Normal breath sounds. No wheezing.   Abdominal:      General: Bowel sounds are normal.      Palpations: Abdomen is soft.      Tenderness: There is no abdominal tenderness. There is no guarding. "   Musculoskeletal:         General: Normal range of motion.      Cervical back: Neck supple.   Skin:     General: Skin is warm and dry.   Neurological:      General: No focal deficit present.      Mental Status: She is alert and oriented to person, place, and time.   Psychiatric:         Mood and Affect: Mood normal.         Behavior: Behavior normal.         Assessment/Plan   Problem List Items Addressed This Visit       Hyperglycemia    Relevant Medications    metFORMIN (Glucophage) 500 mg tablet    Hyperlipidemia    Relevant Medications    atorvastatin (Lipitor) 20 mg tablet     Other Visit Diagnoses       Medicare annual wellness visit, subsequent    -  Primary    Encounter for immunization        Relevant Orders    Pneumococcal conjugate vaccine, 20-valent (PREVNAR 20)          Fasting blood work reviewed.   We reviewed appropriate preventative health screening guidelines.  We discussed regular aerobic exercise. We discussed proper nutrition and weight control.    Metformin refilled.  Blood pressure low, asymptomatic

## 2024-04-23 ENCOUNTER — TELEPHONE (OUTPATIENT)
Dept: PRIMARY CARE | Facility: CLINIC | Age: 69
End: 2024-04-23
Payer: MEDICARE

## 2024-04-25 DIAGNOSIS — Z78.0 ASYMPTOMATIC POSTMENOPAUSAL STATE: ICD-10-CM

## 2024-05-01 ENCOUNTER — TELEPHONE (OUTPATIENT)
Dept: PRIMARY CARE | Facility: CLINIC | Age: 69
End: 2024-05-01
Payer: MEDICARE

## 2024-05-01 ENCOUNTER — HOSPITAL ENCOUNTER (OUTPATIENT)
Dept: RADIOLOGY | Facility: CLINIC | Age: 69
Discharge: HOME | End: 2024-05-01
Payer: MEDICARE

## 2024-05-01 DIAGNOSIS — Z78.0 ASYMPTOMATIC POSTMENOPAUSAL STATE: ICD-10-CM

## 2024-05-01 PROCEDURE — 77080 DXA BONE DENSITY AXIAL: CPT

## 2024-05-01 NOTE — TELEPHONE ENCOUNTER
Patient completed her bone density and noticed results were in chart.  She would like someone call her and give her the results.

## 2024-06-28 ENCOUNTER — TELEPHONE (OUTPATIENT)
Dept: PRIMARY CARE | Facility: CLINIC | Age: 69
End: 2024-06-28
Payer: MEDICARE

## 2024-07-11 DIAGNOSIS — E11.9 TYPE 2 DIABETES MELLITUS WITHOUT COMPLICATION, WITHOUT LONG-TERM CURRENT USE OF INSULIN (MULTI): ICD-10-CM

## 2024-07-11 DIAGNOSIS — E78.5 HYPERLIPIDEMIA, UNSPECIFIED HYPERLIPIDEMIA TYPE: ICD-10-CM

## 2024-07-11 DIAGNOSIS — R73.9 HYPERGLYCEMIA: ICD-10-CM

## 2024-07-18 RX ORDER — METFORMIN HYDROCHLORIDE 500 MG/1
500 TABLET ORAL
Qty: 180 TABLET | Refills: 0 | Status: SHIPPED | OUTPATIENT
Start: 2024-07-18

## 2024-07-18 RX ORDER — ATORVASTATIN CALCIUM 20 MG/1
20 TABLET, FILM COATED ORAL DAILY
Qty: 90 TABLET | Refills: 0 | Status: SHIPPED | OUTPATIENT
Start: 2024-07-18

## 2024-10-07 ENCOUNTER — LAB (OUTPATIENT)
Dept: LAB | Facility: LAB | Age: 69
End: 2024-10-07
Payer: MEDICARE

## 2024-10-07 DIAGNOSIS — E11.9 TYPE 2 DIABETES MELLITUS WITHOUT COMPLICATION, WITHOUT LONG-TERM CURRENT USE OF INSULIN (MULTI): ICD-10-CM

## 2024-10-07 LAB
ALBUMIN SERPL BCP-MCNC: 4 G/DL (ref 3.4–5)
ALP SERPL-CCNC: 58 U/L (ref 33–136)
ALT SERPL W P-5'-P-CCNC: 29 U/L (ref 7–45)
ANION GAP SERPL CALC-SCNC: 10 MMOL/L (ref 10–20)
AST SERPL W P-5'-P-CCNC: 19 U/L (ref 9–39)
BASOPHILS # BLD AUTO: 0.05 X10*3/UL (ref 0–0.1)
BASOPHILS NFR BLD AUTO: 0.9 %
BILIRUB SERPL-MCNC: 1 MG/DL (ref 0–1.2)
BUN SERPL-MCNC: 21 MG/DL (ref 6–23)
CALCIUM SERPL-MCNC: 9.1 MG/DL (ref 8.6–10.3)
CHLORIDE SERPL-SCNC: 109 MMOL/L (ref 98–107)
CO2 SERPL-SCNC: 26 MMOL/L (ref 21–32)
CREAT SERPL-MCNC: 0.81 MG/DL (ref 0.5–1.05)
EGFRCR SERPLBLD CKD-EPI 2021: 80 ML/MIN/1.73M*2
EOSINOPHIL # BLD AUTO: 0.16 X10*3/UL (ref 0–0.7)
EOSINOPHIL NFR BLD AUTO: 2.8 %
ERYTHROCYTE [DISTWIDTH] IN BLOOD BY AUTOMATED COUNT: 12.2 % (ref 11.5–14.5)
EST. AVERAGE GLUCOSE BLD GHB EST-MCNC: 148 MG/DL
GLUCOSE SERPL-MCNC: 152 MG/DL (ref 74–99)
HBA1C MFR BLD: 6.8 %
HCT VFR BLD AUTO: 43.1 % (ref 36–46)
HGB BLD-MCNC: 14.4 G/DL (ref 12–16)
IMM GRANULOCYTES # BLD AUTO: 0.02 X10*3/UL (ref 0–0.7)
IMM GRANULOCYTES NFR BLD AUTO: 0.4 % (ref 0–0.9)
LYMPHOCYTES # BLD AUTO: 2.05 X10*3/UL (ref 1.2–4.8)
LYMPHOCYTES NFR BLD AUTO: 36.3 %
MCH RBC QN AUTO: 29.6 PG (ref 26–34)
MCHC RBC AUTO-ENTMCNC: 33.4 G/DL (ref 32–36)
MCV RBC AUTO: 89 FL (ref 80–100)
MONOCYTES # BLD AUTO: 0.68 X10*3/UL (ref 0.1–1)
MONOCYTES NFR BLD AUTO: 12 %
NEUTROPHILS # BLD AUTO: 2.69 X10*3/UL (ref 1.2–7.7)
NEUTROPHILS NFR BLD AUTO: 47.6 %
NRBC BLD-RTO: 0 /100 WBCS (ref 0–0)
PLATELET # BLD AUTO: 199 X10*3/UL (ref 150–450)
POTASSIUM SERPL-SCNC: 4.1 MMOL/L (ref 3.5–5.3)
PROT SERPL-MCNC: 6.1 G/DL (ref 6.4–8.2)
RBC # BLD AUTO: 4.86 X10*6/UL (ref 4–5.2)
SODIUM SERPL-SCNC: 141 MMOL/L (ref 136–145)
WBC # BLD AUTO: 5.7 X10*3/UL (ref 4.4–11.3)

## 2024-10-07 PROCEDURE — 83036 HEMOGLOBIN GLYCOSYLATED A1C: CPT

## 2024-10-07 PROCEDURE — 80053 COMPREHEN METABOLIC PANEL: CPT

## 2024-10-07 PROCEDURE — 85025 COMPLETE CBC W/AUTO DIFF WBC: CPT

## 2024-10-07 PROCEDURE — 36415 COLL VENOUS BLD VENIPUNCTURE: CPT

## 2024-10-15 DIAGNOSIS — E78.5 HYPERLIPIDEMIA, UNSPECIFIED HYPERLIPIDEMIA TYPE: ICD-10-CM

## 2024-10-15 DIAGNOSIS — R73.9 HYPERGLYCEMIA: ICD-10-CM

## 2024-10-15 NOTE — TELEPHONE ENCOUNTER
Refills sent to Dr Lee for Metformin 500 mg 1 tablet 2 times and Lipitor 20 mg 1 daily  To review and send into the pharmacy   [facility] 10-15-24    [facility] [phone]

## 2024-10-16 RX ORDER — ATORVASTATIN CALCIUM 20 MG/1
20 TABLET, FILM COATED ORAL DAILY
Qty: 30 TABLET | Refills: 0 | Status: SHIPPED | OUTPATIENT
Start: 2024-10-16 | End: 2024-11-15

## 2024-10-16 RX ORDER — METFORMIN HYDROCHLORIDE 500 MG/1
500 TABLET ORAL
Qty: 60 TABLET | Refills: 0 | Status: SHIPPED | OUTPATIENT
Start: 2024-10-16 | End: 2024-11-15

## 2024-10-21 ENCOUNTER — APPOINTMENT (OUTPATIENT)
Dept: PRIMARY CARE | Facility: CLINIC | Age: 69
End: 2024-10-21
Payer: MEDICARE

## 2024-10-21 VITALS
SYSTOLIC BLOOD PRESSURE: 100 MMHG | WEIGHT: 194 LBS | DIASTOLIC BLOOD PRESSURE: 60 MMHG | BODY MASS INDEX: 33.3 KG/M2 | HEART RATE: 77 BPM | OXYGEN SATURATION: 99 % | TEMPERATURE: 98 F

## 2024-10-21 DIAGNOSIS — E78.5 HYPERLIPIDEMIA, UNSPECIFIED HYPERLIPIDEMIA TYPE: ICD-10-CM

## 2024-10-21 DIAGNOSIS — M25.562 CHRONIC PAIN OF LEFT KNEE: ICD-10-CM

## 2024-10-21 DIAGNOSIS — R73.9 HYPERGLYCEMIA: ICD-10-CM

## 2024-10-21 DIAGNOSIS — G89.29 CHRONIC PAIN OF LEFT KNEE: ICD-10-CM

## 2024-10-21 PROCEDURE — 1036F TOBACCO NON-USER: CPT | Performed by: STUDENT IN AN ORGANIZED HEALTH CARE EDUCATION/TRAINING PROGRAM

## 2024-10-21 PROCEDURE — 1159F MED LIST DOCD IN RCRD: CPT | Performed by: STUDENT IN AN ORGANIZED HEALTH CARE EDUCATION/TRAINING PROGRAM

## 2024-10-21 PROCEDURE — 1160F RVW MEDS BY RX/DR IN RCRD: CPT | Performed by: STUDENT IN AN ORGANIZED HEALTH CARE EDUCATION/TRAINING PROGRAM

## 2024-10-21 PROCEDURE — 3078F DIAST BP <80 MM HG: CPT | Performed by: STUDENT IN AN ORGANIZED HEALTH CARE EDUCATION/TRAINING PROGRAM

## 2024-10-21 PROCEDURE — 3074F SYST BP LT 130 MM HG: CPT | Performed by: STUDENT IN AN ORGANIZED HEALTH CARE EDUCATION/TRAINING PROGRAM

## 2024-10-21 PROCEDURE — 99213 OFFICE O/P EST LOW 20 MIN: CPT | Performed by: STUDENT IN AN ORGANIZED HEALTH CARE EDUCATION/TRAINING PROGRAM

## 2024-10-21 PROCEDURE — 3048F LDL-C <100 MG/DL: CPT | Performed by: STUDENT IN AN ORGANIZED HEALTH CARE EDUCATION/TRAINING PROGRAM

## 2024-10-21 PROCEDURE — 3044F HG A1C LEVEL LT 7.0%: CPT | Performed by: STUDENT IN AN ORGANIZED HEALTH CARE EDUCATION/TRAINING PROGRAM

## 2024-10-21 PROCEDURE — G2211 COMPLEX E/M VISIT ADD ON: HCPCS | Performed by: STUDENT IN AN ORGANIZED HEALTH CARE EDUCATION/TRAINING PROGRAM

## 2024-10-21 RX ORDER — METFORMIN HYDROCHLORIDE 500 MG/1
500 TABLET ORAL
Qty: 180 TABLET | Refills: 1 | Status: SHIPPED | OUTPATIENT
Start: 2024-10-21

## 2024-10-21 RX ORDER — ATORVASTATIN CALCIUM 20 MG/1
20 TABLET, FILM COATED ORAL DAILY
Qty: 90 TABLET | Refills: 1 | Status: SHIPPED | OUTPATIENT
Start: 2024-10-21

## 2024-10-21 RX ORDER — DICLOFENAC SODIUM 10 MG/G
4 GEL TOPICAL 4 TIMES DAILY
Qty: 100 G | Refills: 1 | Status: SHIPPED | OUTPATIENT
Start: 2024-10-21

## 2024-10-21 ASSESSMENT — ENCOUNTER SYMPTOMS
NAUSEA: 0
FATIGUE: 0
WHEEZING: 0
PALPITATIONS: 0
NUMBNESS: 0
DIZZINESS: 0
SHORTNESS OF BREATH: 0
ABDOMINAL PAIN: 0
HEMATURIA: 0
CONSTIPATION: 0
CHILLS: 0
COUGH: 0
OCCASIONAL FEELINGS OF UNSTEADINESS: 1
DEPRESSION: 1
FREQUENCY: 0
FEVER: 0
DYSPHORIC MOOD: 0
DYSURIA: 0
NERVOUS/ANXIOUS: 0
DIARRHEA: 0
HEADACHES: 0

## 2024-10-21 ASSESSMENT — PATIENT HEALTH QUESTIONNAIRE - PHQ9
SUM OF ALL RESPONSES TO PHQ9 QUESTIONS 1 AND 2: 2
10. IF YOU CHECKED OFF ANY PROBLEMS, HOW DIFFICULT HAVE THESE PROBLEMS MADE IT FOR YOU TO DO YOUR WORK, TAKE CARE OF THINGS AT HOME, OR GET ALONG WITH OTHER PEOPLE: NOT DIFFICULT AT ALL
2. FEELING DOWN, DEPRESSED OR HOPELESS: SEVERAL DAYS
2. FEELING DOWN, DEPRESSED OR HOPELESS: MORE THAN HALF THE DAYS
2. FEELING DOWN, DEPRESSED OR HOPELESS: MORE THAN HALF THE DAYS
5. POOR APPETITE OR OVEREATING: SEVERAL DAYS
1. LITTLE INTEREST OR PLEASURE IN DOING THINGS: NOT AT ALL
1. LITTLE INTEREST OR PLEASURE IN DOING THINGS: NOT AT ALL
SUM OF ALL RESPONSES TO PHQ9 QUESTIONS 1 AND 2: 1

## 2024-10-21 ASSESSMENT — ANXIETY QUESTIONNAIRES
7. FEELING AFRAID AS IF SOMETHING AWFUL MIGHT HAPPEN: NOT AT ALL
2. NOT BEING ABLE TO STOP OR CONTROL WORRYING: NOT AT ALL
6. BECOMING EASILY ANNOYED OR IRRITABLE: NOT AT ALL
5. BEING SO RESTLESS THAT IT IS HARD TO SIT STILL: NOT AT ALL
IF YOU CHECKED OFF ANY PROBLEMS ON THIS QUESTIONNAIRE, HOW DIFFICULT HAVE THESE PROBLEMS MADE IT FOR YOU TO DO YOUR WORK, TAKE CARE OF THINGS AT HOME, OR GET ALONG WITH OTHER PEOPLE: NOT DIFFICULT AT ALL
4. TROUBLE RELAXING: NOT AT ALL
1. FEELING NERVOUS, ANXIOUS, OR ON EDGE: NOT AT ALL
3. WORRYING TOO MUCH ABOUT DIFFERENT THINGS: NOT AT ALL
GAD7 TOTAL SCORE: 0

## 2024-10-21 NOTE — PROGRESS NOTES
Subjective   Patient ID: Chelsey Ayala is a 69 y.o. female who presents for Med Refill, Follow-up (Lab results), and Immunizations (Discuss for  out of country travel).    HPI   Follow up of diabetes.  Current treatments: Metformin 500 mg twice daily. Current symptoms include: none. Patient denies hypoglycemia , paresthesia of the feet, and visual disturbances. Home sugars: Recent has been a little higher, 130s-140s, multiple deaths in the family. Was in the 120s-130s . Last diabetic eye exam one scheduled for November and had one in May.  Statin: Yes  ACE/ARB: No .  Most recent A1c 2 weeks ago was 6.8, down from 7.1 6 months ago. She is going to start exercising twice a day, she does 5 miles on her recumbent bike daily, going to start walking a mile more often.     Her sister is starting into comfort care and brother  of dengue fever this past July.     She is going to [facility] in February. Going to go for Dengue Fever and Yellow Fever vaccines.     Review of Systems   Constitutional:  Negative for chills, fatigue and fever.   Respiratory:  Negative for cough, shortness of breath and wheezing.    Cardiovascular:  Negative for chest pain, palpitations and leg swelling.   Gastrointestinal:  Negative for abdominal pain, constipation, diarrhea and nausea.   Genitourinary:  Negative for dysuria, frequency, hematuria and urgency.   Neurological:  Negative for dizziness, numbness and headaches.   Psychiatric/Behavioral:  Negative for dysphoric mood. The patient is not nervous/anxious.        Objective   /60 (BP Location: Right arm, Patient Position: Sitting, BP Cuff Size: Large adult)   Pulse 77   Temp 36.7 °C (98 °F) (Temporal)   Wt 88 kg (194 lb)   SpO2 99%   BMI 33.30 kg/m²     Physical Exam  Constitutional:       Appearance: Normal appearance.   HENT:      Head: Normocephalic and atraumatic.   Eyes:      Extraocular Movements: Extraocular movements intact.      Pupils: Pupils are equal,  round, and reactive to light.   Cardiovascular:      Rate and Rhythm: Normal rate and regular rhythm.      Heart sounds: Normal heart sounds. No murmur heard.  Pulmonary:      Effort: Pulmonary effort is normal.      Breath sounds: Normal breath sounds. No wheezing.   Musculoskeletal:         General: Normal range of motion.   Skin:     General: Skin is warm and dry.   Neurological:      General: No focal deficit present.      Mental Status: She is alert and oriented to person, place, and time.   Psychiatric:         Mood and Affect: Mood normal.         Behavior: Behavior normal.         Assessment/Plan   Problem List Items Addressed This Visit       Hyperglycemia    Relevant Medications    metFORMIN (Glucophage) 500 mg tablet    Hyperlipidemia    Relevant Medications    atorvastatin (Lipitor) 20 mg tablet    Chronic pain of left knee    Relevant Medications    diclofenac sodium (Voltaren) 1 % gel     A1c improved.  Will continue current medications.  Will see her back in 6 months for her Medicare physical with fasting blood work prior    Donya Lee DO  10/21/2024

## 2025-03-10 ENCOUNTER — OFFICE VISIT (OUTPATIENT)
Dept: URGENT CARE | Age: 70
End: 2025-03-10
Payer: MEDICARE

## 2025-03-10 VITALS
HEART RATE: 73 BPM | OXYGEN SATURATION: 96 % | HEIGHT: 63 IN | BODY MASS INDEX: 35.58 KG/M2 | DIASTOLIC BLOOD PRESSURE: 72 MMHG | SYSTOLIC BLOOD PRESSURE: 117 MMHG | RESPIRATION RATE: 16 BRPM | TEMPERATURE: 97.9 F | WEIGHT: 200.8 LBS

## 2025-03-10 DIAGNOSIS — J01.00 ACUTE NON-RECURRENT MAXILLARY SINUSITIS: Primary | ICD-10-CM

## 2025-03-10 PROCEDURE — 3078F DIAST BP <80 MM HG: CPT | Performed by: NURSE PRACTITIONER

## 2025-03-10 PROCEDURE — 3074F SYST BP LT 130 MM HG: CPT | Performed by: NURSE PRACTITIONER

## 2025-03-10 PROCEDURE — 99213 OFFICE O/P EST LOW 20 MIN: CPT | Performed by: NURSE PRACTITIONER

## 2025-03-10 PROCEDURE — 1159F MED LIST DOCD IN RCRD: CPT | Performed by: NURSE PRACTITIONER

## 2025-03-10 PROCEDURE — 1036F TOBACCO NON-USER: CPT | Performed by: NURSE PRACTITIONER

## 2025-03-10 PROCEDURE — 3008F BODY MASS INDEX DOCD: CPT | Performed by: NURSE PRACTITIONER

## 2025-03-10 RX ORDER — PROMETHAZINE HYDROCHLORIDE AND DEXTROMETHORPHAN HYDROBROMIDE 6.25; 15 MG/5ML; MG/5ML
5 SYRUP ORAL 4 TIMES DAILY PRN
Qty: 118 ML | Refills: 0 | Status: SHIPPED | OUTPATIENT
Start: 2025-03-10 | End: 2025-03-17

## 2025-03-10 RX ORDER — AMOXICILLIN 875 MG/1
875 TABLET, FILM COATED ORAL 2 TIMES DAILY
Qty: 20 TABLET | Refills: 0 | Status: SHIPPED | OUTPATIENT
Start: 2025-03-10 | End: 2025-03-20

## 2025-03-10 ASSESSMENT — ENCOUNTER SYMPTOMS
LOSS OF SENSATION IN FEET: 0
DEPRESSION: 0
OCCASIONAL FEELINGS OF UNSTEADINESS: 0

## 2025-03-10 NOTE — PROGRESS NOTES
"Subjective   Patient ID: Chelsey Ayala is a 69 y.o. female. They present today with a chief complaint of Cough (C/o cough for 5x days  now with yellow mucus and some nasal congestion.).    History of Present Illness  68 yo female coming in for cough, yellow mucous, and nasal congestion. She states she has been having sinus pressure and sinus headaches as well. She denies any fevers or chills. She denies any other complaints today.     Past Medical History  Allergies as of 03/10/2025 - Reviewed 03/10/2025   Allergen Reaction Noted    Animal dander Shortness of breath 05/14/2021    House dust Shortness of breath 05/14/2021    Pollen extracts Other 10/16/2023       (Not in a hospital admission)       Past Medical History:   Diagnosis Date    Diabetes mellitus (Multi)     History of high cholesterol        Past Surgical History:   Procedure Laterality Date    EYE SURGERY          reports that she quit smoking about 37 years ago. Her smoking use included cigarettes. She has never used smokeless tobacco. She reports that she does not currently use alcohol. She reports that she does not use drugs.    Review of Systems  Review of Systems:  General: No weight loss, fatigue, anorexia, insomnia, fever, chills.  ENT: No pharyngitis, dry mouth, positive nasal congestion and sinus pressure, no ear pain  Cardiac: No chest pain, palpitations, syncope, near syncope.  Pulmonary:  No shortness of breath, positive cough, no hemoptysis  Heme/lymph: No swollen glands, fever, bleeding  Musculoskeletal: No limb pain, joint pain, joint swelling.  Skin: No rashes  Neuro: No numbness, tingling, positive sinus headaches                                 Objective    Vitals:    03/10/25 0827   BP: 117/72   BP Location: Left arm   Patient Position: Sitting   BP Cuff Size: Large adult   Pulse: 73   Resp: 16   Temp: 36.6 °C (97.9 °F)   TempSrc: Oral   SpO2: 96%   Weight: 91.1 kg (200 lb 12.8 oz)   Height: 1.6 m (5' 3\")     No LMP " recorded.    Physical Exam  Physical Exam:  General: Vital noted, no distress. Afebrile  EENT: Eyes unremarkable, Pupils PERRLA, EOMs intact. TMs unremarkable. Posterior oropharynx unremarkable. Uvula in the midline and non-edematous. No PTA. No retropharyngeal mass. No Ryan's angina.  Cardiac: Regular rate and rhythm, no murmur  Pulmonary: Lungs clear bilaterally with good aeration. No adventitious breath sounds.  Skin: No rashes  Neuro: No focal neurologic deficits, NIH score of 0.    Procedures    Point of Care Test & Imaging Results from this visit  No results found for this visit on 03/10/25.   No results found.    Diagnostic study results (if any) were reviewed by BIB Ho.    Assessment/Plan   Allergies, medications, history, and pertinent labs/EKGs/Imaging reviewed by BIB Ho.     Medical Decision Making  Treatment: amoxicillin and phenergan dm prescribed  Differential: 1) sinusitis , 2) bronchitis, 3) uri  Plan: Patient will follow up with the PCP in the next 2-3 days. Return for any worsening symptoms or go to the ER for further evaluation. Patient understands return precautions and discharge insturctions.  Impression:   1) sinusitis      Orders and Diagnoses  Diagnoses and all orders for this visit:  Acute non-recurrent maxillary sinusitis  -     amoxicillin (Amoxil) 875 mg tablet; Take 1 tablet (875 mg) by mouth 2 times a day for 10 days.  -     promethazine-DM (Phenergan-DM) 6.25-15 mg/5 mL syrup; Take 5 mL by mouth 4 times a day as needed for cough for up to 7 days.      Medical Admin Record      Patient disposition: Home    Electronically signed by BIB Ho  8:38 AM

## 2025-04-09 DIAGNOSIS — E11.9 TYPE 2 DIABETES MELLITUS WITHOUT COMPLICATION, WITHOUT LONG-TERM CURRENT USE OF INSULIN: Primary | ICD-10-CM

## 2025-04-09 DIAGNOSIS — E78.2 MIXED HYPERLIPIDEMIA: ICD-10-CM

## 2025-04-12 LAB
ALBUMIN SERPL-MCNC: 4.1 G/DL (ref 3.6–5.1)
ALBUMIN/CREAT UR: 6 MG/G CREAT
ALP SERPL-CCNC: 61 U/L (ref 37–153)
ALT SERPL-CCNC: 34 U/L (ref 6–29)
ANION GAP SERPL CALCULATED.4IONS-SCNC: 8 MMOL/L (CALC) (ref 7–17)
AST SERPL-CCNC: 23 U/L (ref 10–35)
BILIRUB SERPL-MCNC: 0.8 MG/DL (ref 0.2–1.2)
BUN SERPL-MCNC: 20 MG/DL (ref 7–25)
CALCIUM SERPL-MCNC: 9 MG/DL (ref 8.6–10.4)
CHLORIDE SERPL-SCNC: 107 MMOL/L (ref 98–110)
CHOLEST SERPL-MCNC: 125 MG/DL
CHOLEST/HDLC SERPL: 2.7 (CALC)
CO2 SERPL-SCNC: 24 MMOL/L (ref 20–32)
CREAT SERPL-MCNC: 0.68 MG/DL (ref 0.5–1.05)
CREAT UR-MCNC: 125 MG/DL (ref 20–275)
EGFRCR SERPLBLD CKD-EPI 2021: 95 ML/MIN/1.73M2
ERYTHROCYTE [DISTWIDTH] IN BLOOD BY AUTOMATED COUNT: 12.9 % (ref 11–15)
EST. AVERAGE GLUCOSE BLD GHB EST-MCNC: 166 MG/DL
EST. AVERAGE GLUCOSE BLD GHB EST-SCNC: 9.2 MMOL/L
GLUCOSE SERPL-MCNC: 134 MG/DL (ref 65–99)
HBA1C MFR BLD: 7.4 % OF TOTAL HGB
HCT VFR BLD AUTO: 44.7 % (ref 35–45)
HDLC SERPL-MCNC: 47 MG/DL
HGB BLD-MCNC: 14.7 G/DL (ref 11.7–15.5)
LDLC SERPL CALC-MCNC: 63 MG/DL (CALC)
MCH RBC QN AUTO: 30.4 PG (ref 27–33)
MCHC RBC AUTO-ENTMCNC: 32.9 G/DL (ref 32–36)
MCV RBC AUTO: 92.5 FL (ref 80–100)
MICROALBUMIN UR-MCNC: 0.7 MG/DL
NONHDLC SERPL-MCNC: 78 MG/DL (CALC)
PLATELET # BLD AUTO: 203 THOUSAND/UL (ref 140–400)
PMV BLD REES-ECKER: 11.5 FL (ref 7.5–12.5)
POTASSIUM SERPL-SCNC: 4.1 MMOL/L (ref 3.5–5.3)
PROT SERPL-MCNC: 6.2 G/DL (ref 6.1–8.1)
RBC # BLD AUTO: 4.83 MILLION/UL (ref 3.8–5.1)
SODIUM SERPL-SCNC: 139 MMOL/L (ref 135–146)
TRIGL SERPL-MCNC: 71 MG/DL
WBC # BLD AUTO: 5.7 THOUSAND/UL (ref 3.8–10.8)

## 2025-04-21 ENCOUNTER — APPOINTMENT (OUTPATIENT)
Dept: PRIMARY CARE | Facility: CLINIC | Age: 70
End: 2025-04-21
Payer: MEDICARE

## 2025-04-21 VITALS
DIASTOLIC BLOOD PRESSURE: 60 MMHG | TEMPERATURE: 98 F | SYSTOLIC BLOOD PRESSURE: 110 MMHG | WEIGHT: 200.4 LBS | HEIGHT: 63 IN | BODY MASS INDEX: 35.51 KG/M2 | HEART RATE: 74 BPM | OXYGEN SATURATION: 95 %

## 2025-04-21 DIAGNOSIS — Z13.31 DEPRESSION SCREENING: ICD-10-CM

## 2025-04-21 DIAGNOSIS — E78.2 MIXED HYPERLIPIDEMIA: ICD-10-CM

## 2025-04-21 DIAGNOSIS — E66.01 OBESITY, MORBID (MULTI): ICD-10-CM

## 2025-04-21 DIAGNOSIS — Z00.00 MEDICARE ANNUAL WELLNESS VISIT, SUBSEQUENT: Primary | ICD-10-CM

## 2025-04-21 DIAGNOSIS — Z12.11 COLON CANCER SCREENING: ICD-10-CM

## 2025-04-21 DIAGNOSIS — Z71.89 ADVANCED CARE PLANNING/COUNSELING DISCUSSION: ICD-10-CM

## 2025-04-21 DIAGNOSIS — E11.9 TYPE 2 DIABETES MELLITUS WITHOUT COMPLICATION, WITHOUT LONG-TERM CURRENT USE OF INSULIN: ICD-10-CM

## 2025-04-21 PROCEDURE — 1036F TOBACCO NON-USER: CPT | Performed by: STUDENT IN AN ORGANIZED HEALTH CARE EDUCATION/TRAINING PROGRAM

## 2025-04-21 PROCEDURE — 1123F ACP DISCUSS/DSCN MKR DOCD: CPT | Performed by: STUDENT IN AN ORGANIZED HEALTH CARE EDUCATION/TRAINING PROGRAM

## 2025-04-21 PROCEDURE — 3074F SYST BP LT 130 MM HG: CPT | Performed by: STUDENT IN AN ORGANIZED HEALTH CARE EDUCATION/TRAINING PROGRAM

## 2025-04-21 PROCEDURE — 99497 ADVNCD CARE PLAN 30 MIN: CPT | Performed by: STUDENT IN AN ORGANIZED HEALTH CARE EDUCATION/TRAINING PROGRAM

## 2025-04-21 PROCEDURE — G0444 DEPRESSION SCREEN ANNUAL: HCPCS | Performed by: STUDENT IN AN ORGANIZED HEALTH CARE EDUCATION/TRAINING PROGRAM

## 2025-04-21 PROCEDURE — G0439 PPPS, SUBSEQ VISIT: HCPCS | Performed by: STUDENT IN AN ORGANIZED HEALTH CARE EDUCATION/TRAINING PROGRAM

## 2025-04-21 PROCEDURE — 3078F DIAST BP <80 MM HG: CPT | Performed by: STUDENT IN AN ORGANIZED HEALTH CARE EDUCATION/TRAINING PROGRAM

## 2025-04-21 PROCEDURE — 3008F BODY MASS INDEX DOCD: CPT | Performed by: STUDENT IN AN ORGANIZED HEALTH CARE EDUCATION/TRAINING PROGRAM

## 2025-04-21 PROCEDURE — 1159F MED LIST DOCD IN RCRD: CPT | Performed by: STUDENT IN AN ORGANIZED HEALTH CARE EDUCATION/TRAINING PROGRAM

## 2025-04-21 PROCEDURE — 1170F FXNL STATUS ASSESSED: CPT | Performed by: STUDENT IN AN ORGANIZED HEALTH CARE EDUCATION/TRAINING PROGRAM

## 2025-04-21 PROCEDURE — 99214 OFFICE O/P EST MOD 30 MIN: CPT | Performed by: STUDENT IN AN ORGANIZED HEALTH CARE EDUCATION/TRAINING PROGRAM

## 2025-04-21 PROCEDURE — 1158F ADVNC CARE PLAN TLK DOCD: CPT | Performed by: STUDENT IN AN ORGANIZED HEALTH CARE EDUCATION/TRAINING PROGRAM

## 2025-04-21 PROCEDURE — 1160F RVW MEDS BY RX/DR IN RCRD: CPT | Performed by: STUDENT IN AN ORGANIZED HEALTH CARE EDUCATION/TRAINING PROGRAM

## 2025-04-21 RX ORDER — ATORVASTATIN CALCIUM 20 MG/1
20 TABLET, FILM COATED ORAL DAILY
Qty: 90 TABLET | Refills: 3 | Status: SHIPPED | OUTPATIENT
Start: 2025-04-21

## 2025-04-21 RX ORDER — METFORMIN HYDROCHLORIDE 1000 MG/1
1000 TABLET ORAL
Qty: 180 TABLET | Refills: 1 | Status: SHIPPED | OUTPATIENT
Start: 2025-04-21

## 2025-04-21 ASSESSMENT — ENCOUNTER SYMPTOMS
DIZZINESS: 0
ABDOMINAL PAIN: 0
CONSTIPATION: 0
CHILLS: 0
FREQUENCY: 0
OCCASIONAL FEELINGS OF UNSTEADINESS: 0
DIARRHEA: 0
COUGH: 0
DYSPHORIC MOOD: 0
WHEEZING: 0
NERVOUS/ANXIOUS: 0
FATIGUE: 0
FEVER: 0
DEPRESSION: 0
DYSURIA: 0
HEADACHES: 0
NAUSEA: 0
HEMATURIA: 0
NUMBNESS: 0
SHORTNESS OF BREATH: 0
PALPITATIONS: 0

## 2025-04-21 ASSESSMENT — PATIENT HEALTH QUESTIONNAIRE - PHQ9
SUM OF ALL RESPONSES TO PHQ9 QUESTIONS 1 AND 2: 0
1. LITTLE INTEREST OR PLEASURE IN DOING THINGS: NOT AT ALL
2. FEELING DOWN, DEPRESSED OR HOPELESS: NOT AT ALL
SUM OF ALL RESPONSES TO PHQ9 QUESTIONS 1 AND 2: 0
1. LITTLE INTEREST OR PLEASURE IN DOING THINGS: NOT AT ALL
2. FEELING DOWN, DEPRESSED OR HOPELESS: NOT AT ALL

## 2025-04-21 ASSESSMENT — ACTIVITIES OF DAILY LIVING (ADL)
DRESSING: INDEPENDENT
TAKING_MEDICATION: INDEPENDENT
BATHING: INDEPENDENT
MANAGING_FINANCES: INDEPENDENT
GROCERY_SHOPPING: INDEPENDENT
DOING_HOUSEWORK: INDEPENDENT

## 2025-04-21 NOTE — PROGRESS NOTES
Subjective   Reason for Visit: Chelsey Ayala is an 70 y.o. female here for a Medicare Wellness visit.     Past Medical, Surgical, and Family History reviewed and updated in chart.    Reviewed all medications by prescribing practitioner or clinical pharmacist (such as prescriptions, OTCs, herbal therapies and supplements) and documented in the medical record.    Med Refill  Pertinent negatives include no abdominal pain, chest pain, chills, coughing, fatigue, fever, headaches, nausea or numbness.     Specialists seen by patient: eye doctor: AALIYAH  -Dentist  -periodontist    Last pap/cervical cancer screening: Not applicable  Last mammogram: doesn't do them  Hx of colon ca screening: Feels she doesn't have support from family during prep. Will do cologuard  Hx of DXA: 2024  History of depression or anxiety: No  Immunizations: Up-to-date  Diet: Diabetic  Exercise: Gets regular exercise, recumbent bike, 5 miles daily, walking   Alcohol abuse screen:   none   How many times in the past year 4 or more drinks in a day?  Lung cancer screening:   Smoking history: ex-smoker and quit in 1990  Drug use: No  Depression screen yearly (phq-2): 0  Living will/healthcare power of :No, does think she wants DNR CCA possibly for full code/DNI    Patient Self Assessment of Health Status  Patient Self Assessment: Good  Functional Ability/Level of Safety  Cognitive Impairment Observed: No cognitive impairment observed  Nutrition and Exercise  Current Diet: Diabetic Diet  Adequate Fluid Intake: Yes  Caffeine: Yes  Exercise Frequency: Regularly  ADL Screening  Hearing - Right Ear: Difficulty with noise  Hearing - Left Ear: Difficulty with noise  Bathing: Independent  Dressing: Independent  Walks in Home: Independent  IADL's  Managing Finances: Independent  Grocery Shopping: Independent  Taking Medication: Independent  Doing Housework: Independent    Follow up of diabetes.  Current treatments: Metformin 500 mg twice daily.  "Current symptoms include: none. Patient denies paresthesia of the feet and visual disturbances. Home sugars: 120s . Last diabetic eye exam yearly.  Statin: Yes atorvastatin 20 mg ACE/ARB: No low bp.  A1c 7.4, it was 6.8 in October 2024.  Urine micro: April 2025, normal.              Patient Care Team:  Donya Lee DO as PCP - General (Family Medicine)  Donya Lee DO as PCP - [facility] Attributed Provider     Review of Systems   Constitutional:  Negative for chills, fatigue and fever.   Respiratory:  Negative for cough, shortness of breath and wheezing.    Cardiovascular:  Negative for chest pain, palpitations and leg swelling.   Gastrointestinal:  Negative for abdominal pain, constipation, diarrhea and nausea.   Genitourinary:  Negative for dysuria, frequency, hematuria and urgency.   Neurological:  Negative for dizziness, numbness and headaches.   Psychiatric/Behavioral:  Negative for dysphoric mood. The patient is not nervous/anxious.        Objective   Vitals:  /60 (BP Location: Right arm, Patient Position: Sitting, BP Cuff Size: Large adult)   Pulse 74   Temp 36.7 °C (98 °F) (Temporal)   Ht 1.605 m (5' 3.19\")   Wt 90.9 kg (200 lb 6.4 oz)   SpO2 95%   BMI 35.29 kg/m²       Physical Exam  Constitutional:       General: She is not in acute distress.     Appearance: Normal appearance.   HENT:      Head: Normocephalic and atraumatic.      Right Ear: Tympanic membrane and ear canal normal.      Left Ear: Tympanic membrane and ear canal normal.      Mouth/Throat:      Mouth: Mucous membranes are moist.      Pharynx: No posterior oropharyngeal erythema.   Eyes:      Extraocular Movements: Extraocular movements intact.      Pupils: Pupils are equal, round, and reactive to light.   Cardiovascular:      Rate and Rhythm: Normal rate and regular rhythm.      Heart sounds: No murmur heard.  Pulmonary:      Effort: Pulmonary effort is normal. No respiratory distress.      Breath sounds: Normal breath " sounds. No wheezing.   Abdominal:      General: Bowel sounds are normal.      Palpations: Abdomen is soft.      Tenderness: There is no abdominal tenderness. There is no guarding.   Musculoskeletal:         General: Normal range of motion.      Cervical back: Neck supple.   Skin:     General: Skin is warm and dry.   Neurological:      General: No focal deficit present.      Mental Status: She is alert and oriented to person, place, and time.   Psychiatric:         Mood and Affect: Mood normal.         Behavior: Behavior normal.         Assessment/Plan   Problem List Items Addressed This Visit       Hyperlipidemia    Relevant Medications    atorvastatin (Lipitor) 20 mg tablet    Type 2 diabetes mellitus without complication, without long-term current use of insulin    Relevant Medications    metFORMIN (Glucophage) 1,000 mg tablet    Other Relevant Orders    Comprehensive Metabolic Panel    Hemoglobin A1C    Obesity, morbid (Multi)     Other Visit Diagnoses         Medicare annual wellness visit, subsequent    -  Primary      Colon cancer screening        Relevant Orders    Cologuard® colon cancer screening      Depression screening          Advanced care planning/counseling discussion                Fasting blood work reviewed.   We reviewed appropriate preventative health screening guidelines.  We discussed regular aerobic exercise. We discussed proper nutrition and weight control.    Increasing metformin to 1000 mg twice daily and we will see her back in 6 months.  Will have her get blood work ahead of time because one of her liver enzymes was slightly elevated.  Suspect hepatic steatosis.  Discussed possible GLP but she would like to hold off on that at this time.    Advance Care Planning   Patient is going to look into what her wishes would be.  We discussed that she might be considering a DNI but would like CPR.  We discussed what her goals of that would be in terms of wanting CPR but no intubation and if she  really wanted to be revived but not at anything after that in terms of CPR and intubation.  We discussed what CPR is and what is involved.  We discussed what intubation is and outcomes associated with that.  She is going to look into her options more.  I did discuss that it is important she have these conversations with her healthcare power of  as well.      Advance Directives Discussion  16 - 20 minutes were spent discussing Advanced Care Planning (including a Living Will, Medical Power Of , as well as specific end of life choices and/or directives). The details of that discussion were documented in Advanced Directives Discussion section of the medical record.           5-10 minutes were spent screening for depression using PHQ-2/PHQ-9 as documented in the chart

## 2025-04-24 ENCOUNTER — PATIENT MESSAGE (OUTPATIENT)
Dept: PRIMARY CARE | Facility: CLINIC | Age: 70
End: 2025-04-24
Payer: MEDICARE

## 2025-04-28 LAB — NONINV COLON CA DNA+OCC BLD SCRN STL QL: NEGATIVE

## 2025-08-05 ENCOUNTER — TELEPHONE (OUTPATIENT)
Dept: PRIMARY CARE | Facility: CLINIC | Age: 70
End: 2025-08-05
Payer: MEDICARE

## 2025-08-05 DIAGNOSIS — Z12.31 ENCOUNTER FOR SCREENING MAMMOGRAM FOR BREAST CANCER: ICD-10-CM

## 2025-08-28 ENCOUNTER — APPOINTMENT (OUTPATIENT)
Dept: RADIOLOGY | Facility: CLINIC | Age: 70
End: 2025-08-28
Payer: MEDICARE

## 2025-08-28 DIAGNOSIS — Z12.31 ENCOUNTER FOR SCREENING MAMMOGRAM FOR BREAST CANCER: ICD-10-CM

## 2025-08-28 PROCEDURE — 77067 SCR MAMMO BI INCL CAD: CPT

## 2025-10-06 ENCOUNTER — APPOINTMENT (OUTPATIENT)
Dept: PRIMARY CARE | Facility: CLINIC | Age: 70
End: 2025-10-06
Payer: MEDICARE